# Patient Record
Sex: MALE | Race: ASIAN | NOT HISPANIC OR LATINO | URBAN - METROPOLITAN AREA
[De-identification: names, ages, dates, MRNs, and addresses within clinical notes are randomized per-mention and may not be internally consistent; named-entity substitution may affect disease eponyms.]

---

## 2017-08-31 ENCOUNTER — EMERGENCY (EMERGENCY)
Facility: HOSPITAL | Age: 67
LOS: 1 days | Discharge: HOME | End: 2017-08-31
Attending: EMERGENCY MEDICINE | Admitting: EMERGENCY MEDICINE
Payer: MEDICARE

## 2017-08-31 VITALS
RESPIRATION RATE: 16 BRPM | HEART RATE: 80 BPM | OXYGEN SATURATION: 100 % | DIASTOLIC BLOOD PRESSURE: 90 MMHG | TEMPERATURE: 98 F | SYSTOLIC BLOOD PRESSURE: 152 MMHG

## 2017-08-31 PROCEDURE — 12001 RPR S/N/AX/GEN/TRNK 2.5CM/<: CPT

## 2017-08-31 PROCEDURE — 99284 EMERGENCY DEPT VISIT MOD MDM: CPT | Mod: 25,GC

## 2017-08-31 RX ORDER — TETANUS TOXOID, REDUCED DIPHTHERIA TOXOID AND ACELLULAR PERTUSSIS VACCINE, ADSORBED 5; 2.5; 8; 8; 2.5 [IU]/.5ML; [IU]/.5ML; UG/.5ML; UG/.5ML; UG/.5ML
0.5 SUSPENSION INTRAMUSCULAR ONCE
Qty: 0 | Refills: 0 | Status: COMPLETED | OUTPATIENT
Start: 2017-08-31 | End: 2017-08-31

## 2017-08-31 RX ADMIN — TETANUS TOXOID, REDUCED DIPHTHERIA TOXOID AND ACELLULAR PERTUSSIS VACCINE, ADSORBED 0.5 MILLILITER(S): 5; 2.5; 8; 8; 2.5 SUSPENSION INTRAMUSCULAR at 18:05

## 2017-08-31 NOTE — ED PROVIDER NOTE - OBJECTIVE STATEMENT
66M no PMH p/w laceration to L hand between 2nd and 3rd digits on dorsal surface sustained while using electric saw; saw slipped and he cut himself. No numbness/weakness/tingling. Normal cap refill. Took motrin prior to arrival, currently no pain. Bleeding controlled, no on anticoagulation. Unknown last tetanus.

## 2017-08-31 NOTE — ED PROVIDER NOTE - ATTENDING CONTRIBUTION TO CARE
Dr. Gillespie:  I have personally performed a face to face bedside history and physical examination of this patient. I have discussed the history, examination, review of systems, assessment and plan of management with the resident. I have reviewed the electronic medical record and amended it to reflect my history, review of systems, physical exam, assessment and plan.    66M presents with laceration to left hand, by the MCP joint between 2nd and 3rd digits dorsally.  Sustained due to accident with electric saw.  Does not recall last tetanus.    Exam:  - well appearing  - 1.5cm laceration between 2nd and 3rd left MCP, neurovascularly intact    A/P  - laceration  - update tetanus  - suture and discharge with return precautions

## 2017-08-31 NOTE — ED PROCEDURE NOTE - ATTENDING CONTRIBUTION TO CARE
Dr. Gillespie:  I have personally performed a face to face bedside history and physical examination of this patient. I have discussed the history, examination, review of systems, assessment and plan of management with the resident. I have reviewed the electronic medical record and amended it to reflect my history, review of systems, physical exam, assessment and plan.

## 2017-08-31 NOTE — ED PROCEDURE NOTE - PROCEDURE ADDITIONAL DETAILS
66M with 1cm linear laceration from saw accident. No tendon or muscle involvement, full ROM, neurovascularly intact. Local anesthesia administered (2cc 1% lidocaine no epi), wound copiously irrigated with normal saline under pressure, wound explored and then repaired with 4 simple interrupted 4-0 nylon sutures. Instructed to remove sutures in 10 days at ED or with PMD, discussed signs of infection to prompt rapid return to ED.

## 2017-08-31 NOTE — ED ADULT TRIAGE NOTE - CHIEF COMPLAINT QUOTE
cut top of left hand between 2nd and 3rd fingers, using an electric saw. no active bleeding. covered with bandage. denies use of anticoagulants.

## 2019-07-19 NOTE — ED PROVIDER NOTE - NSCAREINITIATED _GEN_ER
Pt lethargic this morning Dr. sanford new orders placed. A&O,Pt is on 2l of O2 NC. Elevate Temp,hr and rr this morning triggered- Lactic 0.5.Up with assist of two , sling on to the RU- [facility].Pt slept most of the night, did not take any pain medication. Dressing is clean dry and intact. Rees patent and draining. Up coming RN update on the status of the pt. Will continue to monitor.Plan is to discharge to TCU   Mari Colindres(Resident)

## 2022-01-09 NOTE — ED PROVIDER NOTE - MEDICAL DECISION MAKING DETAILS
Patient has chronic hypothyroidism. TFTs reviewed-   Lab Results   Component Value Date    TSH 3.575 01/09/2022   Will continue chronic levothyroxine and adjust for and clinical changes. Repeat TSH normal. Resume 88 mcg daily.    66M with laceration from electric saw, will need irrigation/cleansing of wound and laceration repair, update tetanus.

## 2025-01-08 NOTE — ED PROVIDER NOTE - SKIN TEMP
Patient with gradually downtrending hemoglobin, down at 6.8 today, we will administer 1 unit PRBC.  No obvious source of bleeding, may be iatrogenic from blood draws.  We will continue to monitor for any source of bleeding.   warm